# Patient Record
Sex: FEMALE | Race: WHITE | NOT HISPANIC OR LATINO | URBAN - METROPOLITAN AREA
[De-identification: names, ages, dates, MRNs, and addresses within clinical notes are randomized per-mention and may not be internally consistent; named-entity substitution may affect disease eponyms.]

---

## 2017-01-09 ENCOUNTER — ALLSCRIPTS OFFICE VISIT (OUTPATIENT)
Dept: OTHER | Facility: OTHER | Age: 22
End: 2017-01-09

## 2017-06-09 ENCOUNTER — ALLSCRIPTS OFFICE VISIT (OUTPATIENT)
Dept: OTHER | Facility: OTHER | Age: 22
End: 2017-06-09

## 2017-06-09 LAB — S PYO AG THROAT QL: NEGATIVE

## 2018-01-14 VITALS
BODY MASS INDEX: 19.07 KG/M2 | HEART RATE: 80 BPM | TEMPERATURE: 98.9 F | DIASTOLIC BLOOD PRESSURE: 74 MMHG | WEIGHT: 101 LBS | HEIGHT: 61 IN | RESPIRATION RATE: 16 BRPM | SYSTOLIC BLOOD PRESSURE: 110 MMHG

## 2018-01-14 VITALS
WEIGHT: 101.2 LBS | RESPIRATION RATE: 16 BRPM | DIASTOLIC BLOOD PRESSURE: 62 MMHG | HEIGHT: 61 IN | HEART RATE: 80 BPM | TEMPERATURE: 99.9 F | SYSTOLIC BLOOD PRESSURE: 104 MMHG | BODY MASS INDEX: 19.11 KG/M2

## 2018-02-28 ENCOUNTER — TELEPHONE (OUTPATIENT)
Dept: FAMILY MEDICINE CLINIC | Facility: CLINIC | Age: 23
End: 2018-02-28

## 2018-02-28 DIAGNOSIS — L70.0 ACNE VULGARIS: Primary | ICD-10-CM

## 2018-02-28 RX ORDER — MINOCYCLINE HYDROCHLORIDE 100 MG/1
100 CAPSULE ORAL EVERY 12 HOURS SCHEDULED
Qty: 90 CAPSULE | Refills: 0 | Status: SHIPPED | OUTPATIENT
Start: 2018-02-28 | End: 2018-05-29

## 2018-02-28 NOTE — TELEPHONE ENCOUNTER
rx [facility] -  She cannot start it until she has a normal menses and is on birth control

## 2018-02-28 NOTE — TELEPHONE ENCOUNTER
Patient requesting refill on Gianvi 3-0 02 MG # 80   TAKE AS DIRECTED  SENT TO [facility]     Patient is currently living in [place], but has been unable to find physician in that area as of yet

## 2018-02-28 NOTE — TELEPHONE ENCOUNTER
Patient called  She is currently living in [place]  She is having an acne flare up and has not established a doctor in that area yet   She wants to know If you can send something in for her like she was on before from her dermatologist like spironolactone or minocycline to [facility]

## 2018-03-01 NOTE — TELEPHONE ENCOUNTER
SPOKE WITH Pt, GAVE MESSAGE  SHE THEN PROCEEDS TO TELL ME SHE VIDEO-CHATED  WITH A DOCTOR WHO ALSO PRESCRIBED THAT FOR HER

## 2018-05-03 ENCOUNTER — TELEPHONE (OUTPATIENT)
Dept: FAMILY MEDICINE CLINIC | Facility: CLINIC | Age: 23
End: 2018-05-03

## 2018-05-03 DIAGNOSIS — T78.40XA ALLERGIC REACTION, INITIAL ENCOUNTER: Primary | ICD-10-CM

## 2018-05-03 RX ORDER — PREDNISONE 10 MG/1
TABLET ORAL
Qty: 15 TABLET | Refills: 0 | Status: SHIPPED | OUTPATIENT
Start: 2018-05-03

## 2018-05-03 NOTE — TELEPHONE ENCOUNTER
pls call and get info from patient-  Blisters are usually not an allergy- that is usually an infection  What sx is she having? Wheezing? Coughing?  Speak with pateint dede

## 2018-05-03 NOTE — TELEPHONE ENCOUNTER
Patient's Mom called back  She said this happens whenever her daughter experiences a change of environment  There are no breathing issues or other symptoms, strictly these tiny, itchy "blister" reactions on her legs, lip & eyelids  She said in the past the prednisone knocks it right out

## 2018-05-03 NOTE — TELEPHONE ENCOUNTER
Pt's mother called  Patient and her family are vacationing( [place])  She stated pt is having an allergy attack  She mentioned this is normal for her and you have prescribed her Prednisone in the past for this issue  Patient has blisters around her mouth  and eyelids  She is asking if you can prescribe her the prednisone again without her being seen at our office  If not She would like a call back [phone]          Pharmacy Prescription can be sent to:  [facility] [address], [address] [facility] [phone]